# Patient Record
Sex: FEMALE | Race: WHITE | URBAN - METROPOLITAN AREA
[De-identification: names, ages, dates, MRNs, and addresses within clinical notes are randomized per-mention and may not be internally consistent; named-entity substitution may affect disease eponyms.]

---

## 2021-08-28 ENCOUNTER — TELEPHONE ENCOUNTER (OUTPATIENT)
Dept: URBAN - METROPOLITAN AREA CLINIC 13 | Facility: CLINIC | Age: 66
End: 2021-08-28

## 2021-08-29 ENCOUNTER — TELEPHONE ENCOUNTER (OUTPATIENT)
Dept: URBAN - METROPOLITAN AREA CLINIC 13 | Facility: CLINIC | Age: 66
End: 2021-08-29

## 2023-03-21 ENCOUNTER — WEB ENCOUNTER (OUTPATIENT)
Dept: URBAN - NONMETROPOLITAN AREA CLINIC 2 | Facility: CLINIC | Age: 68
End: 2023-03-21

## 2023-03-21 ENCOUNTER — LAB OUTSIDE AN ENCOUNTER (OUTPATIENT)
Dept: URBAN - NONMETROPOLITAN AREA CLINIC 2 | Facility: CLINIC | Age: 68
End: 2023-03-21

## 2023-03-21 ENCOUNTER — OFFICE VISIT (OUTPATIENT)
Dept: URBAN - NONMETROPOLITAN AREA CLINIC 2 | Facility: CLINIC | Age: 68
End: 2023-03-21
Payer: COMMERCIAL

## 2023-03-21 VITALS
DIASTOLIC BLOOD PRESSURE: 77 MMHG | SYSTOLIC BLOOD PRESSURE: 170 MMHG | HEART RATE: 60 BPM | BODY MASS INDEX: 32 KG/M2 | WEIGHT: 163 LBS | HEIGHT: 60 IN

## 2023-03-21 DIAGNOSIS — K86.89 ACUTE PANCREATIC FLUID COLLECTION: ICD-10-CM

## 2023-03-21 DIAGNOSIS — Z12.11 SCREENING FOR COLON CANCER: ICD-10-CM

## 2023-03-21 DIAGNOSIS — R31.29 HEMATURIA, MICROSCOPIC: ICD-10-CM

## 2023-03-21 PROCEDURE — 99204 OFFICE O/P NEW MOD 45 MIN: CPT | Performed by: INTERNAL MEDICINE

## 2023-03-21 PROCEDURE — 99244 OFF/OP CNSLTJ NEW/EST MOD 40: CPT | Performed by: INTERNAL MEDICINE

## 2023-03-21 RX ORDER — NITROFURANTOIN MACROCRYSTALS 50 MG/1
1 CAPSULE AT BEDTIME WITH FOOD OR MILK CAPSULE ORAL ONCE A DAY
Status: ACTIVE | COMMUNITY

## 2023-03-21 RX ORDER — PROPRANOLOL HYDROCHLORIDE 10 MG/1
1 TABLET TABLET ORAL ONCE A DAY
Status: ACTIVE | COMMUNITY

## 2023-03-21 RX ORDER — TIZANIDINE 2 MG/1
1 TABLET AS NEEDED TABLET ORAL THREE TIMES A DAY
Status: ACTIVE | COMMUNITY

## 2023-03-21 RX ORDER — CHLORDIAZEPOXIDE HYDROCHLORIDE AND CLIDINIUM BROMIDE 5; 2.5 MG/1; MG/1
1 CAPSULE BEFORE MEALS CAPSULE ORAL THREE TIMES A DAY
Status: ACTIVE | COMMUNITY

## 2023-03-21 RX ORDER — LEVOTHYROXINE SODIUM 75 UG/1
1 TABLET IN THE MORNING ON AN EMPTY STOMACH TABLET ORAL ONCE A DAY
Status: ACTIVE | COMMUNITY

## 2023-03-21 RX ORDER — ESCITALOPRAM OXALATE 10 MG/1
1 TABLET TABLET, FILM COATED ORAL ONCE A DAY
Status: ACTIVE | COMMUNITY

## 2023-03-21 RX ORDER — TRAZODONE HYDROCHLORIDE 50 MG/1
1 TABLET AT BEDTIME AS NEEDED TABLET ORAL ONCE A DAY
Status: ACTIVE | COMMUNITY

## 2023-03-21 NOTE — HPI-TODAY'S VISIT:
3/21/2023 Ms. Ramirez was referred to our office for evaluation abnormal imaging with pancreatic lesion by Dr. Kat Mascorro . A copy of this note and recommendations will be sent to the referring provider's office. She has a significant family history of bladder cancer in her mother and noted some recent hematuria which was worked up with a CT of her abdomen and pelvis.  Incidental finding was a small 2 to 3 cm lesion in her pancreas which warrants further evaluation.  She denies any significant tobacco use, alcohol intake or drug use. She states that she might be due for repeat screening colonoscopy but does not want to consider that at this time, focusing mostly on her neurology evaluation. She denies significant abdominal pain but does feel some left flank pain intermittently.  Denies any changes in her bowel habits or unintentional weight loss. She does have significant claustrophobia and will require some sedation prescribed prior to any imaging such as an MRI. SP

## 2023-03-22 ENCOUNTER — TELEPHONE ENCOUNTER (OUTPATIENT)
Dept: URBAN - METROPOLITAN AREA CLINIC 35 | Facility: CLINIC | Age: 68
End: 2023-03-22

## 2023-03-22 LAB
A/G RATIO: 1.8
ABSOLUTE BASOPHILS: 32
ABSOLUTE EOSINOPHILS: 140
ABSOLUTE LYMPHOCYTES: 1625
ABSOLUTE MONOCYTES: 475
ABSOLUTE NEUTROPHILS: 3127
ALBUMIN: 4.4
ALKALINE PHOSPHATASE: 81
ALT (SGPT): 14
AST (SGOT): 17
BASOPHILS: 0.6
BILIRUBIN, TOTAL: 0.4
BUN/CREATININE RATIO: (no result)
BUN: 17
C-REACTIVE PROTEIN, QUANT: 3.5
CALCIUM: 9.7
CARBON DIOXIDE, TOTAL: 29
CHLORIDE: 103
CREATININE: 0.57
EGFR: 99
EOSINOPHILS: 2.6
GLOBULIN, TOTAL: 2.5
GLUCOSE: 80
HEMATOCRIT: 41.5
HEMOGLOBIN: 14
LYMPHOCYTES: 30.1
MCH: 30.1
MCHC: 33.7
MCV: 89.2
MONOCYTES: 8.8
MPV: 9.4
NEUTROPHILS: 57.9
PLATELET COUNT: 305
POTASSIUM: 4.4
PROTEIN, TOTAL: 6.9
RDW: 12.2
RED BLOOD CELL COUNT: 4.65
SED RATE BY MODIFIED: 6
SODIUM: 139
WHITE BLOOD CELL COUNT: 5.4

## 2023-03-27 ENCOUNTER — TELEPHONE ENCOUNTER (OUTPATIENT)
Dept: URBAN - METROPOLITAN AREA CLINIC 35 | Facility: CLINIC | Age: 68
End: 2023-03-27

## 2023-04-14 ENCOUNTER — TELEPHONE ENCOUNTER (OUTPATIENT)
Dept: URBAN - METROPOLITAN AREA CLINIC 35 | Facility: CLINIC | Age: 68
End: 2023-04-14

## 2023-04-20 ENCOUNTER — LAB OUTSIDE AN ENCOUNTER (OUTPATIENT)
Dept: URBAN - METROPOLITAN AREA CLINIC 35 | Facility: CLINIC | Age: 68
End: 2023-04-20

## 2023-05-12 ENCOUNTER — OFFICE VISIT (OUTPATIENT)
Dept: URBAN - METROPOLITAN AREA MEDICAL CENTER 1 | Facility: MEDICAL CENTER | Age: 68
End: 2023-05-12
Payer: COMMERCIAL

## 2023-05-12 ENCOUNTER — LAB OUTSIDE AN ENCOUNTER (OUTPATIENT)
Dept: URBAN - NONMETROPOLITAN AREA CLINIC 2 | Facility: CLINIC | Age: 68
End: 2023-05-12

## 2023-05-12 DIAGNOSIS — K86.89 OTHER SPECIFIED DISEASES OF PANCREAS: ICD-10-CM

## 2023-05-12 LAB — AMYLASE FLUID: 15

## 2023-05-12 PROCEDURE — 43242 EGD US FINE NEEDLE BX/ASPIR: CPT | Performed by: INTERNAL MEDICINE

## 2023-05-22 LAB — MISC RESULTS: (no result)

## 2023-06-27 ENCOUNTER — LAB OUTSIDE AN ENCOUNTER (OUTPATIENT)
Dept: URBAN - NONMETROPOLITAN AREA CLINIC 2 | Facility: CLINIC | Age: 68
End: 2023-06-27

## 2023-06-27 ENCOUNTER — OFFICE VISIT (OUTPATIENT)
Dept: URBAN - NONMETROPOLITAN AREA CLINIC 2 | Facility: CLINIC | Age: 68
End: 2023-06-27
Payer: COMMERCIAL

## 2023-06-27 VITALS
BODY MASS INDEX: 32 KG/M2 | DIASTOLIC BLOOD PRESSURE: 79 MMHG | WEIGHT: 163 LBS | HEART RATE: 67 BPM | SYSTOLIC BLOOD PRESSURE: 154 MMHG | HEIGHT: 60 IN

## 2023-06-27 DIAGNOSIS — Z12.11 SCREENING FOR COLON CANCER: ICD-10-CM

## 2023-06-27 DIAGNOSIS — K86.9 PANCREATIC LESION: ICD-10-CM

## 2023-06-27 DIAGNOSIS — Z87.19 H/O INFLAMMATORY BOWEL DISEASE: ICD-10-CM

## 2023-06-27 DIAGNOSIS — R31.9 HEMATURIA, UNSPECIFIED TYPE: ICD-10-CM

## 2023-06-27 DIAGNOSIS — K62.89 RECTAL PAIN: ICD-10-CM

## 2023-06-27 PROBLEM — 34436003: Status: ACTIVE | Noted: 2023-03-21

## 2023-06-27 PROCEDURE — 99214 OFFICE O/P EST MOD 30 MIN: CPT

## 2023-06-27 RX ORDER — TRAZODONE HYDROCHLORIDE 50 MG/1
1 TABLET AT BEDTIME AS NEEDED TABLET ORAL ONCE A DAY
Status: ACTIVE | COMMUNITY

## 2023-06-27 RX ORDER — PROPRANOLOL HYDROCHLORIDE 10 MG/1
1 TABLET TABLET ORAL ONCE A DAY
Status: ACTIVE | COMMUNITY

## 2023-06-27 RX ORDER — ESCITALOPRAM OXALATE 10 MG/1
1 TABLET TABLET, FILM COATED ORAL ONCE A DAY
Status: ACTIVE | COMMUNITY

## 2023-06-27 RX ORDER — CHLORDIAZEPOXIDE HYDROCHLORIDE AND CLIDINIUM BROMIDE 5; 2.5 MG/1; MG/1
1 CAPSULE BEFORE MEALS CAPSULE ORAL THREE TIMES A DAY
Status: ACTIVE | COMMUNITY

## 2023-06-27 RX ORDER — TIZANIDINE 2 MG/1
1 TABLET AS NEEDED TABLET ORAL THREE TIMES A DAY
Status: ACTIVE | COMMUNITY

## 2023-06-27 RX ORDER — LEVOTHYROXINE SODIUM 75 UG/1
1 TABLET IN THE MORNING ON AN EMPTY STOMACH TABLET ORAL ONCE A DAY
Status: ACTIVE | COMMUNITY

## 2023-06-27 RX ORDER — NITROFURANTOIN MACROCRYSTALS 50 MG/1
1 CAPSULE AT BEDTIME WITH FOOD OR MILK CAPSULE ORAL ONCE A DAY
Status: ACTIVE | COMMUNITY

## 2023-06-27 RX ORDER — SODIUM, POTASSIUM,MAG SULFATES 17.5-3.13G
177 ML SOLUTION, RECONSTITUTED, ORAL ORAL ONCE
Qty: 1 KIT | Refills: 0 | OUTPATIENT
Start: 2023-06-27

## 2023-06-27 NOTE — HPI-TODAY'S VISIT:
3/21/2023 Ms. Ramirez was referred to our office for evaluation abnormal imaging with pancreatic lesion by Dr. Kat Mascorro . A copy of this note and recommendations will be sent to the referring provider's office. She has a significant family history of bladder cancer in her mother and noted some recent hematuria which was worked up with a CT of her abdomen and pelvis.  Incidental finding was a small 2 to 3 cm lesion in her pancreas which warrants further evaluation.  She denies any significant tobacco use, alcohol intake or drug use. She states that she might be due for repeat screening colonoscopy but does not want to consider that at this time, focusing mostly on her urology evaluation. She denies significant abdominal pain but does feel some left flank pain intermittently.  Denies any changes in her bowel habits or unintentional weight loss. She does have significant claustrophobia and will require some sedation prescribed prior to any imaging such as an MRI. SP  6/27/2023 Kathryn returns to clinic for follow-up following her EUS with Dr. Valero for pancreatic cyst.  She would like to review the results on the fluid analysis.  Following her EUS Dr. Valero documented her cyst to be identified in the pancreatic tail with no obvious communication with the pancreatic duct.  Lesion measured 29 mm x 28 mm in maximal cross-sectional diameter.  Needle aspiration for fluid was performed in the amount of fluid collected was 10 mL with the fluid being clear and white.  The appearance at that time was suggestive of pancreatic pseudocyst.  Amylase resulted at 15 units/L and CEA cyst fluid resulted 89536 ng/mL.  These results were reviewed with both Dr. Valero and Dr. Cardoza and recommendations were provided to repeat imaging at 6-month interval and make determinations at that time.  Unfortunately she has severe anxiety regarding MRI testing but we discussed surveillance utility at length. Timing is limited as she has extensive camper travel plans this fall. During her initial visit she emphatically stated she wanted to handle her pancreatic work-up before discussing colon work-up.  Today she reveals a history of diagnosis of ulcerative colitis at the age of 21, with colonoscopy.  She states no history of colon resection, fistulas or obstructions.  She denies any family history of colon cancer.  She only had 1 other colonoscopy which was completed around the age of 49 with Dr. House in [facility], reportedly normal.  She denies any weight loss or rectal bleeding.  She has a bowel movement approximately every other day however at times she may flare with 1 to very watery bowel movements.  There have been if few occasions where she has woken up during the night to have a bowel movement.  It is during these events that she feels rectal spasms and uses a heating pad.  Most recent episode was 4 days ago.  She also has rectal pain if she sits for long periods of time, walks for a moderate period of time, or passes flatus through the day.  She denies any sense of fissure or hemorrhoid.  In the past she states she has been told she has IBS.  She is on no medications for her bowels.  She reveals an onset of significant eye irritation over the last months which was treated with steroid drops.  She also feels low fatigue and generalized discomfort in her joints.  She states 20 years of almost daily use of Advil at 400 mg twice a day. SP

## 2023-06-28 LAB
A/G RATIO: 1.8
ABSOLUTE BASOPHILS: 42
ABSOLUTE EOSINOPHILS: 156
ABSOLUTE LYMPHOCYTES: 2694
ABSOLUTE MONOCYTES: 588
ABSOLUTE NEUTROPHILS: 2520
ALBUMIN: 4.4
ALKALINE PHOSPHATASE: 95
ALT (SGPT): 20
AST (SGOT): 20
BASOPHILS: 0.7
BILIRUBIN, TOTAL: 0.4
BUN/CREATININE RATIO: (no result)
BUN: 21
C-REACTIVE PROTEIN, QUANT: 4.6
CALCIUM: 10
CARBON DIOXIDE, TOTAL: 28
CHLORIDE: 102
CREATININE: 0.64
EGFR: 96
EOSINOPHILS: 2.6
GLOBULIN, TOTAL: 2.5
GLUCOSE: 87
HEMATOCRIT: 39.1
HEMOGLOBIN: 13.7
LYMPHOCYTES: 44.9
MCH: 31.6
MCHC: 35
MCV: 90.3
MONOCYTES: 9.8
MPV: 9.7
NEUTROPHILS: 42
PLATELET COUNT: 263
POTASSIUM: 5
PROTEIN, TOTAL: 6.9
RDW: 12.5
RED BLOOD CELL COUNT: 4.33
SED RATE BY MODIFIED: 9
SODIUM: 138
WHITE BLOOD CELL COUNT: 6

## 2023-06-29 ENCOUNTER — TELEPHONE ENCOUNTER (OUTPATIENT)
Dept: URBAN - NONMETROPOLITAN AREA CLINIC 2 | Facility: CLINIC | Age: 68
End: 2023-06-29

## 2023-07-03 LAB — CALPROTECTIN, FECAL: 22

## 2023-07-17 ENCOUNTER — TELEPHONE ENCOUNTER (OUTPATIENT)
Dept: URBAN - NONMETROPOLITAN AREA CLINIC 2 | Facility: CLINIC | Age: 68
End: 2023-07-17

## 2023-09-26 ENCOUNTER — LAB OUTSIDE AN ENCOUNTER (OUTPATIENT)
Dept: URBAN - NONMETROPOLITAN AREA CLINIC 2 | Facility: CLINIC | Age: 68
End: 2023-09-26

## 2023-10-11 ENCOUNTER — CLAIMS CREATED FROM THE CLAIM WINDOW (OUTPATIENT)
Dept: URBAN - NONMETROPOLITAN AREA SURGERY CENTER 1 | Facility: SURGERY CENTER | Age: 68
End: 2023-10-11
Payer: COMMERCIAL

## 2023-10-11 DIAGNOSIS — Z12.11 COLON CANCER SCREENING: ICD-10-CM

## 2023-10-11 DIAGNOSIS — Z12.11 COLON CANCER SCREENING (HIGH RISK): ICD-10-CM

## 2023-10-11 PROCEDURE — G0121 COLON CA SCRN NOT HI RSK IND: HCPCS | Performed by: INTERNAL MEDICINE

## 2023-10-11 PROCEDURE — 00812 ANES LWR INTST SCR COLSC: CPT | Performed by: NURSE ANESTHETIST, CERTIFIED REGISTERED

## 2023-10-11 PROCEDURE — G8907 PT DOC NO EVENTS ON DISCHARG: HCPCS | Performed by: INTERNAL MEDICINE

## 2023-11-16 ENCOUNTER — TELEPHONE ENCOUNTER (OUTPATIENT)
Dept: URBAN - NONMETROPOLITAN AREA CLINIC 2 | Facility: CLINIC | Age: 68
End: 2023-11-16

## 2023-11-27 ENCOUNTER — OFFICE VISIT (OUTPATIENT)
Dept: URBAN - NONMETROPOLITAN AREA CLINIC 2 | Facility: CLINIC | Age: 68
End: 2023-11-27
Payer: COMMERCIAL

## 2023-11-27 VITALS
BODY MASS INDEX: 31.41 KG/M2 | WEIGHT: 160 LBS | HEIGHT: 60 IN | TEMPERATURE: 98.1 F | SYSTOLIC BLOOD PRESSURE: 132 MMHG | HEART RATE: 83 BPM | DIASTOLIC BLOOD PRESSURE: 73 MMHG

## 2023-11-27 DIAGNOSIS — R31.9 HEMATURIA, UNSPECIFIED TYPE: ICD-10-CM

## 2023-11-27 DIAGNOSIS — Z87.19 H/O INFLAMMATORY BOWEL DISEASE: ICD-10-CM

## 2023-11-27 DIAGNOSIS — K62.89 RECTAL PAIN: ICD-10-CM

## 2023-11-27 DIAGNOSIS — K86.9 PANCREATIC LESION: ICD-10-CM

## 2023-11-27 DIAGNOSIS — Z12.11 SCREENING FOR COLON CANCER: ICD-10-CM

## 2023-11-27 PROBLEM — 608844008: Status: ACTIVE | Noted: 2023-06-27

## 2023-11-27 PROBLEM — 3855007: Status: ACTIVE | Noted: 2023-03-21

## 2023-11-27 PROBLEM — 77880009: Status: ACTIVE | Noted: 2023-06-27

## 2023-11-27 PROBLEM — 305058001: Status: ACTIVE | Noted: 2023-03-21

## 2023-11-27 PROCEDURE — 99213 OFFICE O/P EST LOW 20 MIN: CPT

## 2023-11-27 RX ORDER — TRAZODONE HYDROCHLORIDE 50 MG/1
1 TABLET AT BEDTIME AS NEEDED TABLET ORAL ONCE A DAY
Status: ACTIVE | COMMUNITY

## 2023-11-27 RX ORDER — SODIUM, POTASSIUM,MAG SULFATES 17.5-3.13G
177 ML SOLUTION, RECONSTITUTED, ORAL ORAL ONCE
Qty: 1 KIT | Refills: 0 | Status: ACTIVE | COMMUNITY
Start: 2023-06-27

## 2023-11-27 RX ORDER — CHLORDIAZEPOXIDE HYDROCHLORIDE AND CLIDINIUM BROMIDE 5; 2.5 MG/1; MG/1
1 CAPSULE BEFORE MEALS CAPSULE ORAL THREE TIMES A DAY
Status: ACTIVE | COMMUNITY

## 2023-11-27 RX ORDER — PROPRANOLOL HYDROCHLORIDE 10 MG/1
1 TABLET TABLET ORAL ONCE A DAY
Status: ACTIVE | COMMUNITY

## 2023-11-27 RX ORDER — ESCITALOPRAM OXALATE 10 MG/1
1 TABLET TABLET, FILM COATED ORAL ONCE A DAY
Status: ACTIVE | COMMUNITY

## 2023-11-27 RX ORDER — TIZANIDINE 2 MG/1
1 TABLET AS NEEDED TABLET ORAL THREE TIMES A DAY
Status: ACTIVE | COMMUNITY

## 2023-11-27 RX ORDER — LEVOTHYROXINE SODIUM 75 UG/1
1 TABLET IN THE MORNING ON AN EMPTY STOMACH TABLET ORAL ONCE A DAY
Status: ACTIVE | COMMUNITY

## 2023-11-27 RX ORDER — NITROFURANTOIN MACROCRYSTALS 50 MG/1
1 CAPSULE AT BEDTIME WITH FOOD OR MILK CAPSULE ORAL ONCE A DAY
Status: ACTIVE | COMMUNITY

## 2023-11-27 NOTE — HPI-TODAY'S VISIT:
11/27/2023 Ms. Ramirez returns to clinic for follow-up with IPN in with the most recent MRI in November showing stable and recommended by Dr. Valero to repeat MRI in 6 months. Her colonoscopy last month was normal with normal TI.  She is due to repeat for screening purposes in 10 years. Today she states she is doing quite well and only has occasional episodes of loose stool but this is several weeks apart and not related to heavier fat or sugary meals.  She feels this is quite manageable and denies any issues with urgency or incontinence.  Today we discussed her keeping a food diary to analyze for any common denominator.  She will return in 4 months and we will schedule her repeat imaging. DINA

## 2024-04-22 ENCOUNTER — LAB (OUTPATIENT)
Dept: URBAN - NONMETROPOLITAN AREA CLINIC 2 | Facility: CLINIC | Age: 69
End: 2024-04-22

## 2024-04-22 ENCOUNTER — OV EP (OUTPATIENT)
Dept: URBAN - NONMETROPOLITAN AREA CLINIC 2 | Facility: CLINIC | Age: 69
End: 2024-04-22
Payer: COMMERCIAL

## 2024-04-22 VITALS
HEIGHT: 60 IN | TEMPERATURE: 98 F | BODY MASS INDEX: 31.61 KG/M2 | SYSTOLIC BLOOD PRESSURE: 121 MMHG | HEART RATE: 98 BPM | WEIGHT: 161 LBS | DIASTOLIC BLOOD PRESSURE: 77 MMHG

## 2024-04-22 DIAGNOSIS — K86.9 PANCREATIC LESION: ICD-10-CM

## 2024-04-22 DIAGNOSIS — Z12.11 SCREENING FOR COLON CANCER: ICD-10-CM

## 2024-04-22 DIAGNOSIS — R31.9 HEMATURIA, UNSPECIFIED TYPE: ICD-10-CM

## 2024-04-22 DIAGNOSIS — Z87.19 H/O INFLAMMATORY BOWEL DISEASE: ICD-10-CM

## 2024-04-22 DIAGNOSIS — R19.5 LOOSE STOOLS: ICD-10-CM

## 2024-04-22 DIAGNOSIS — K62.89 RECTAL PAIN: ICD-10-CM

## 2024-04-22 PROBLEM — 398032003: Status: ACTIVE | Noted: 2024-04-22

## 2024-04-22 PROCEDURE — 99213 OFFICE O/P EST LOW 20 MIN: CPT

## 2024-04-22 RX ORDER — TIZANIDINE 2 MG/1
1 TABLET AS NEEDED TABLET ORAL THREE TIMES A DAY
Status: ACTIVE | COMMUNITY

## 2024-04-22 RX ORDER — ESCITALOPRAM OXALATE 10 MG/1
1 TABLET TABLET, FILM COATED ORAL ONCE A DAY
Status: ACTIVE | COMMUNITY

## 2024-04-22 RX ORDER — PROPRANOLOL HYDROCHLORIDE 10 MG/1
1 TABLET TABLET ORAL ONCE A DAY
Status: ACTIVE | COMMUNITY

## 2024-04-22 RX ORDER — NITROFURANTOIN MACROCRYSTALS 50 MG/1
1 CAPSULE AT BEDTIME WITH FOOD OR MILK CAPSULE ORAL ONCE A DAY
Status: ACTIVE | COMMUNITY

## 2024-04-22 RX ORDER — LEVOTHYROXINE SODIUM 75 UG/1
1 TABLET IN THE MORNING ON AN EMPTY STOMACH TABLET ORAL ONCE A DAY
Status: ACTIVE | COMMUNITY

## 2024-04-22 RX ORDER — CHLORDIAZEPOXIDE HYDROCHLORIDE AND CLIDINIUM BROMIDE 5; 2.5 MG/1; MG/1
1 CAPSULE BEFORE MEALS CAPSULE ORAL THREE TIMES A DAY
Status: ACTIVE | COMMUNITY

## 2024-04-22 RX ORDER — SODIUM, POTASSIUM,MAG SULFATES 17.5-3.13G
177 ML SOLUTION, RECONSTITUTED, ORAL ORAL ONCE
Qty: 1 KIT | Refills: 0 | Status: ACTIVE | COMMUNITY
Start: 2023-06-27

## 2024-04-22 RX ORDER — TRAZODONE HYDROCHLORIDE 50 MG/1
1 TABLET AT BEDTIME AS NEEDED TABLET ORAL ONCE A DAY
Status: ACTIVE | COMMUNITY

## 2024-04-22 NOTE — HPI-TODAY'S VISIT:
11/27/2023 Ms. Ramirez returns to clinic for follow-up with IPN in with the most recent MRI in November showing stable and recommended by Dr. Valero to repeat MRI in 6 months. Her colonoscopy last month was normal with normal TI.  She is due to repeat for screening purposes in 10 years. Today she states she is doing quite well and only has occasional episodes of loose stool but this is several weeks apart and not related to heavier fat or sugary meals.  She feels this is quite manageable and denies any issues with urgency or incontinence.  Today we discussed her keeping a food diary to analyze for any common denominator.  She will return in 4 months and we will schedule her repeat imaging. SP  4/22/2024 Mrs. Ramirez returns for loose stool and pancreatic lesion. She is due to repeat her MRCP. She had back pain prior and denies any recently. Loose stool events have lessened, now 3-4 times over 4 months- she did keep a diary and there is some connection with sugar. She also endorses stress.   Misbah has since had partial lobectomy for lung cancer, also pending thyroid eval.  Otherwise doing well. Prefers to never repeat a colonoscopy.  SP

## 2025-04-21 ENCOUNTER — OFFICE VISIT (OUTPATIENT)
Dept: URBAN - NONMETROPOLITAN AREA CLINIC 2 | Facility: CLINIC | Age: 70
End: 2025-04-21
Payer: COMMERCIAL

## 2025-04-21 ENCOUNTER — DASHBOARD ENCOUNTERS (OUTPATIENT)
Age: 70
End: 2025-04-21

## 2025-04-21 ENCOUNTER — TELEPHONE ENCOUNTER (OUTPATIENT)
Dept: URBAN - NONMETROPOLITAN AREA CLINIC 2 | Facility: CLINIC | Age: 70
End: 2025-04-21

## 2025-04-21 DIAGNOSIS — Z12.11 SCREENING FOR COLON CANCER: ICD-10-CM

## 2025-04-21 DIAGNOSIS — R31.9 HEMATURIA, UNSPECIFIED TYPE: ICD-10-CM

## 2025-04-21 DIAGNOSIS — Z87.19 H/O INFLAMMATORY BOWEL DISEASE: ICD-10-CM

## 2025-04-21 DIAGNOSIS — R19.5 LOOSE STOOLS: ICD-10-CM

## 2025-04-21 DIAGNOSIS — K86.9 PANCREATIC LESION: ICD-10-CM

## 2025-04-21 DIAGNOSIS — K62.89 RECTAL PAIN: ICD-10-CM

## 2025-04-21 PROCEDURE — 99213 OFFICE O/P EST LOW 20 MIN: CPT

## 2025-04-21 RX ORDER — ESCITALOPRAM OXALATE 10 MG/1
1 TABLET TABLET, FILM COATED ORAL ONCE A DAY
Status: ACTIVE | COMMUNITY

## 2025-04-21 RX ORDER — TRAZODONE HYDROCHLORIDE 50 MG/1
1 TABLET AT BEDTIME AS NEEDED TABLET ORAL ONCE A DAY
Status: ACTIVE | COMMUNITY

## 2025-04-21 RX ORDER — LEVOTHYROXINE SODIUM 75 UG/1
1 TABLET IN THE MORNING ON AN EMPTY STOMACH TABLET ORAL ONCE A DAY
Status: ACTIVE | COMMUNITY

## 2025-04-21 RX ORDER — CHLORDIAZEPOXIDE HYDROCHLORIDE AND CLIDINIUM BROMIDE 5; 2.5 MG/1; MG/1
1 CAPSULE BEFORE MEALS CAPSULE ORAL THREE TIMES A DAY
Status: ACTIVE | COMMUNITY

## 2025-04-21 RX ORDER — HYOSCYAMINE SULFATE 0.12 MG/1
1 TABLET UNDER THE TONGUE AND ALLOW TO DISSOLVE AS NEEDED TABLET, ORALLY DISINTEGRATING ORAL THREE TIMES A DAY
Qty: 30 | Refills: 3 | OUTPATIENT
Start: 2025-04-21

## 2025-04-21 RX ORDER — PROPRANOLOL HYDROCHLORIDE 10 MG/1
1 TABLET TABLET ORAL ONCE A DAY
Status: ACTIVE | COMMUNITY

## 2025-04-21 RX ORDER — NITROFURANTOIN MACROCRYSTALS 50 MG/1
1 CAPSULE AT BEDTIME WITH FOOD OR MILK CAPSULE ORAL ONCE A DAY
Status: ACTIVE | COMMUNITY

## 2025-04-21 RX ORDER — TIZANIDINE 2 MG/1
1 TABLET AS NEEDED TABLET ORAL THREE TIMES A DAY
Status: ACTIVE | COMMUNITY

## 2025-04-21 NOTE — HPI-TODAY'S VISIT:
11/27/2023 Ms. Ramirez returns to clinic for follow-up with IPN in with the most recent MRI in November showing stable and recommended by Dr. Valero to repeat MRI in 6 months. Her colonoscopy last month was normal with normal TI.  She is due to repeat for screening purposes in 10 years. Today she states she is doing quite well and only has occasional episodes of loose stool but this is several weeks apart and not related to heavier fat or sugary meals.  She feels this is quite manageable and denies any issues with urgency or incontinence.  Today we discussed her keeping a food diary to analyze for any common denominator.  She will return in 4 months and we will schedule her repeat imaging. SP  4/22/2024 Mrs. Ramirez returns for loose stool and pancreatic lesion. She is due to repeat her MRCP. She had back pain prior and denies any recently. Loose stool events have lessened, now 3-4 times over 4 months- she did keep a diary and there is some connection with sugar. She also endorses stress.   Misbah has since had partial lobectomy for lung cancer, also pending thyroid eval.  Otherwise doing well. Prefers to never repeat a colonoscopy.  SP  4/21/2025 Patient returns for follow up. Her loose stool events are less frequent but still occur approximately monthly with no dietary trigger or stress. She feels some abdominal cramping pain with this. She has no other GI symptoms today. She denies any blood per rectum or unintentional weight loss. Since last OV she had her repeat MRCP which showed stable pancreatic cyst in pancreatic tail with no ductal involvement. She prefers to hold off on any repeat imaging for her pancreatic cyst.  SP

## 2025-04-23 ENCOUNTER — LAB OUTSIDE AN ENCOUNTER (OUTPATIENT)
Dept: URBAN - NONMETROPOLITAN AREA CLINIC 2 | Facility: CLINIC | Age: 70
End: 2025-04-23